# Patient Record
Sex: MALE | Race: WHITE | NOT HISPANIC OR LATINO | URBAN - METROPOLITAN AREA
[De-identification: names, ages, dates, MRNs, and addresses within clinical notes are randomized per-mention and may not be internally consistent; named-entity substitution may affect disease eponyms.]

---

## 2024-08-21 RX ORDER — FUROSEMIDE 20 MG/1
20 TABLET ORAL DAILY
COMMUNITY

## 2024-08-21 RX ORDER — ROSUVASTATIN CALCIUM 5 MG/1
5 TABLET, COATED ORAL NIGHTLY
COMMUNITY

## 2024-08-21 RX ORDER — METOPROLOL SUCCINATE 50 MG/1
1 TABLET, EXTENDED RELEASE ORAL DAILY
COMMUNITY

## 2024-08-21 RX ORDER — AMLODIPINE BESYLATE 2.5 MG/1
2.5 TABLET ORAL DAILY
COMMUNITY

## 2024-08-21 RX ORDER — ALFUZOSIN HYDROCHLORIDE 10 MG/1
10 TABLET, EXTENDED RELEASE ORAL DAILY
COMMUNITY

## 2024-08-21 ASSESSMENT — DUKE ACTIVITY SCORE INDEX (DASI)
TOTAL_SCORE: 25.95
CAN YOU WALK INDOORS, SUCH AS AROUND YOUR HOUSE: YES
CAN YOU CLIMB A FLIGHT OF STAIRS OR WALK UP A HILL: NO
CAN YOU TAKE CARE OF YOURSELF (EAT, DRESS, BATHE, OR USE TOILET): YES
CAN YOU DO HEAVY WORK AROUND THE HOUSE LIKE SCRUBBING FLOORS OR LIFTING AND MOVING HEAVY FURNITURE: YES
DASI METS SCORE: 5.9
CAN YOU DO LIGHT WORK AROUND THE HOUSE LIKE DUSTING OR WASHING DISHES: YES
CAN YOU DO MODERATE WORK AROUND THE HOUSE LIKE VACUUMING, SWEEPING FLOORS OR CARRYING GROCERIES: YES
CAN YOU WALK A BLOCK OR TWO ON LEVEL GROUND: YES
CAN YOU HAVE SEXUAL RELATIONS: NO
CAN YOU DO YARD WORK LIKE RAKING LEAVES, WEEDING OR PUSHING A MOWER: YES
CAN YOU PARTICIPATE IN STRENOUS SPORTS LIKE SWIMMING, SINGLES TENNIS, FOOTBALL, BASKETBALL, OR SKIING: NO
CAN YOU RUN A SHORT DISTANCE: NO
CAN YOU PARTICIPATE IN MODERATE RECREATIONAL ACTIVITIES LIKE GOLF, BOWLING, DANCING, DOUBLES TENNIS OR THROWING A BASEBALL OR FOOTBALL: NO

## 2024-08-21 ASSESSMENT — ACTIVITIES OF DAILY LIVING (ADL): ADL_SCORE: 0

## 2024-08-21 ASSESSMENT — LIFESTYLE VARIABLES: SMOKING_STATUS: NONSMOKER

## 2024-08-22 ENCOUNTER — PRE-ADMISSION TESTING (OUTPATIENT)
Dept: PREADMISSION TESTING | Facility: HOSPITAL | Age: 74
End: 2024-08-22
Payer: MEDICARE

## 2024-08-22 ENCOUNTER — LAB (OUTPATIENT)
Dept: LAB | Facility: LAB | Age: 74
End: 2024-08-22
Payer: MEDICARE

## 2024-08-22 VITALS
HEIGHT: 68 IN | RESPIRATION RATE: 17 BRPM | WEIGHT: 261.69 LBS | OXYGEN SATURATION: 98 % | BODY MASS INDEX: 39.66 KG/M2 | DIASTOLIC BLOOD PRESSURE: 98 MMHG | SYSTOLIC BLOOD PRESSURE: 170 MMHG | TEMPERATURE: 98.1 F | HEART RATE: 76 BPM

## 2024-08-22 DIAGNOSIS — Z01.818 PRE-OP TESTING: ICD-10-CM

## 2024-08-22 DIAGNOSIS — Z01.818 PRE-OP TESTING: Primary | ICD-10-CM

## 2024-08-22 LAB
ANION GAP SERPL CALC-SCNC: 12 MMOL/L (ref 10–20)
ATRIAL RATE: 66 BPM
BUN SERPL-MCNC: 24 MG/DL (ref 6–23)
CALCIUM SERPL-MCNC: 9.6 MG/DL (ref 8.6–10.3)
CHLORIDE SERPL-SCNC: 108 MMOL/L (ref 98–107)
CO2 SERPL-SCNC: 26 MMOL/L (ref 21–32)
CREAT SERPL-MCNC: 1.45 MG/DL (ref 0.5–1.3)
EGFRCR SERPLBLD CKD-EPI 2021: 51 ML/MIN/1.73M*2
GLUCOSE SERPL-MCNC: 121 MG/DL (ref 74–99)
P AXIS: 6 DEGREES
P OFFSET: 128 MS
P ONSET: 82 MS
POTASSIUM SERPL-SCNC: 4.1 MMOL/L (ref 3.5–5.3)
PR INTERVAL: 268 MS
Q ONSET: 216 MS
QRS COUNT: 10 BEATS
QRS DURATION: 90 MS
QT INTERVAL: 394 MS
QTC CALCULATION(BAZETT): 413 MS
QTC FREDERICIA: 407 MS
R AXIS: -18 DEGREES
SODIUM SERPL-SCNC: 142 MMOL/L (ref 136–145)
T AXIS: 68 DEGREES
T OFFSET: 413 MS
VENTRICULAR RATE: 66 BPM

## 2024-08-22 PROCEDURE — 99202 OFFICE O/P NEW SF 15 MIN: CPT | Performed by: NURSE PRACTITIONER

## 2024-08-22 PROCEDURE — 36415 COLL VENOUS BLD VENIPUNCTURE: CPT

## 2024-08-22 PROCEDURE — 80048 BASIC METABOLIC PNL TOTAL CA: CPT

## 2024-08-22 PROCEDURE — 93005 ELECTROCARDIOGRAM TRACING: CPT

## 2024-08-22 RX ORDER — HYDROCHLOROTHIAZIDE 12.5 MG/1
12.5 TABLET ORAL DAILY
COMMUNITY

## 2024-08-22 ASSESSMENT — ENCOUNTER SYMPTOMS
ENDOCRINE NEGATIVE: 1
NEUROLOGICAL NEGATIVE: 1
RESPIRATORY NEGATIVE: 1
CONSTITUTIONAL NEGATIVE: 1
EYES NEGATIVE: 1
NECK NEGATIVE: 1
GASTROINTESTINAL NEGATIVE: 1

## 2024-08-22 NOTE — PREPROCEDURE INSTRUCTIONS
Medication List            Accurate as of August 22, 2024 11:21 AM. Always use your most recent med list.                alfuzosin 10 mg 24 hr tablet  Commonly known as: Uroxatral  Medication Adjustments for Surgery: Take morning of surgery with sip of water, no other fluids     amLODIPine 2.5 mg tablet  Commonly known as: Norvasc  Medication Adjustments for Surgery: Take morning of surgery with sip of water, no other fluids     furosemide 20 mg tablet  Commonly known as: Lasix  Medication Adjustments for Surgery: Continue until night before surgery     hydroCHLOROthiazide 12.5 mg tablet  Commonly known as: Microzide  Medication Adjustments for Surgery: Take morning of surgery with sip of water, no other fluids     rosuvastatin 5 mg tablet  Commonly known as: Crestor  Medication Adjustments for Surgery: Take morning of surgery with sip of water, no other fluids     Toprol XL 50 mg 24 hr tablet  Generic drug: metoprolol succinate XL  Medication Adjustments for Surgery: Take morning of surgery with sip of water, no other fluids                                PRE-OPERATIVE INSTRUCTIONS    You will receive notification one business day prior to your procedure to confirm your arrival time. It is important that you answer your phone and/or check your messages during this time. If you do not hear from the surgery center by 5 pm. the day before your procedure, please call [phone].     Please enter the building through the Outpatient entrance and take the elevator off the lobby to the 2nd floor then check in at the Outpatient Surgery desk on the 2nd floor.    INSTRUCTIONS:  Talk to your surgeon for instructions if you should stop your aspirin, blood thinner, or diabetes medicines.  DO NOT take any multivitamins or over the counter supplements for 7-10 days before surgery.  If not being admitted, you must have an adult immediately available to drive you home after surgery. We also highly recommend you have someone  stay with you for the entire day and night of your surgery.  For children having surgery, a parent or legal guardian must accompany them to the surgery center. If this is not possible, please call [phone] to make additional arrangements.  For adults who are unable to consent or make medical decisions for themselves, a legal guardian or Power of  must accompany them to the surgery center. If this is not possible, please call [phone] to make additional arrangements.  Wear comfortable, loose fitting clothing.  All jewelry and piercings must be removed. If you are unable to remove an item or have a dermal piercing, please be sure to tell the nurse when you arrive for surgery.  Nail polish and make-up must be removed.  Avoid smoking or consuming alcohol for 24 hours before surgery.  To help prevent infection, please take a shower/bath and wash your hair the night before and/or morning of surgery (or follow other specific bathing instructions provided).    Preoperative Fasting Guidelines    Why must I stop eating and drinking near surgery time?  With sedation, food or liquid in your stomach can enter your lungs causing serious complications  Increases nausea and vomiting    When do I need to stop eating and drinking before my surgery?  Do not eat any solid food after midnight the night before your surgery/procedure unless otherwise instructed by your surgeon.   You may have up to 13.5 ounces of clear liquid until TWO hours before your instructed arrival time to the hospital.  This includes water, black tea/coffee, (no milk or cream) apple juice, and electrolyte drinks (Gatorade).   You may chew gum until TWO hours before your surgery/procedure      If applicable, notify your surgeons office immediately of any new skin changes that occur to the surgical limb.      If you have any questions or concerns, please call Pre-Admission Testing at [phone].                                     Home  Preoperative Antibacterial Shower with Chlorhexidine gluconate (CHG)     What is a home preoperative antibacterial shower?  This shower is a way of cleaning the skin with a germ killing solution before surgery. The solution contains chlorhexidine gluconate, commonly known as CHG. CHG is a skin cleanser with germ killing ability. Let your doctor know if you are allergic to chlorhexidine.    Why do I need to take a preoperative antibacterial shower?  Skin is not sterile. It is best to try to make your skin as free of germs as possible before surgery. Proper cleansing with a germ killing soap before surgery can lower the number of germs on your skin. This helps to reduce the risk of infection at the surgical site. Following the instructions listed below will help you prepare your skin for surgery.    How do I use the solution?  Begin using your CHG soap the night before and again the morning of your procedure.   Do not shave the day before or day of surgery.  Remove all jewelry until after surgery. Take off rings and take out all body-piercing jewelry.  Wash your face and hair with normal soap and shampoo before you use the CHG soap.  Apply the CHG solution to a clean wet washcloth. Move away from the water to avoid premature rinsing of the CHG soap as you are applying. Firmly lather your entire body from the neck down. Do not use CHG on your face, eyes, ears, or genitals.   Pay special attention to the area where your incisions will be located.  Do not scrub your skin too hard.  It is important to allow the CHG soap to sit on your skin for 3-5 minutes.  Rinse the solution off your body completely. Do not wash with your normal soap after the CHG soap solution.  Pat yourself dry with a clean, soft towel.  Do not apply powders, lotions or deodorants as these might block how the CHG soap works.   Dress in clean clothing.  Be sure to sleep with clean, freshly laundered sheets.  Be aware that CHG can cause stains on fabric.  Rinse your washcloth and other linens that have contact with CHG completely. Use only non-chlorine detergents to launder the items used.

## 2024-08-22 NOTE — CPM/PAT H&P
CPM/PAT Evaluation       Name: Brendon Vizcarra (Brendon Vizcarra)  /Age: 1950/74 y.o.     In-Person       Chief Complaint: pain at base of thumb    HPI  This is a very pleasant 75 yo with PmHx of hypertension, lipidemia, obesity, and left thumb pain.  Plan is for the soft tissue left thumb.  Patient reports he has pain at the base of his thumb and wrist with any kind of thumb movement.  He denies recent fever or chills.    Past Medical History:   Diagnosis Date    History of chronic kidney disease     Hyperlipidemia     Hypertension     Obesity     Pain of left thumb     base of wrist /thumb    Renal cancer, left (Multi)        Past Surgical History:   Procedure Laterality Date    ANUS SURGERY      CARPAL TUNNEL RELEASE      NEPHRECTOMY Left        Patient  has no history on file for sexual activity.    Family History   Problem Relation Name Age of Onset    Hypertension Mother      Brain Aneurysm Father      Heart attack Father         Allergies   Allergen Reactions    Penicillin Rash       Prior to Admission medications    Medication Sig Start Date End Date Taking? Authorizing Provider   alfuzosin (Uroxatral) 10 mg 24 hr tablet Take 1 tablet (10 mg) by mouth once daily. Do not crush, chew, or split.    Historical Provider, MD   amLODIPine (Norvasc) 2.5 mg tablet Take 1 tablet (2.5 mg) by mouth once daily.    Historical Provider, MD   furosemide (Lasix) 20 mg tablet Take 1 tablet (20 mg) by mouth once daily.    Historical Provider, MD   hydroCHLOROthiazide (Microzide) 12.5 mg tablet Take 1 tablet (12.5 mg) by mouth once daily. Pt has not taken for 2 weeks, has not received this med from his mail order pharmacy as of today.    Historical Provider, MD   metoprolol succinate XL (Toprol XL) 50 mg 24 hr tablet Take 1 tablet (50 mg) by mouth once daily.    Historical Provider, MD   rosuvastatin (Crestor) 5 mg tablet Take 1 tablet (5 mg) by mouth once daily at bedtime.    Historical Provider, MD RODRIGUEZ ROS:    Constitutional:   neg    Neuro/Psych:   neg    Eyes:   neg     use of corrective lenses  Ears:   neg    Nose:   neg    Mouth:   neg    Throat:   neg    Neck:    Carotid bruits appreciated  neg    Cardio:    Pt with Hx of HTN   Had recent Holter monitor, nuclear stress test, and ECG done at [facility]  Respiratory:   neg    Endocrine:   neg    GI:   neg    :    Pt reports overactive bladder- denies dysuria\    Pt reports a hx of nephrectomy of left kidney due to abnormality at top of kidney per pt  Musculoskeletal:    See HPI  Pt has had previous carpal tunnel on right and soft release right thumb due to thumb pain  Hematologic:   neg    Skin:  neg        Physical Exam  Constitutional:       Appearance: Normal appearance.   HENT:      Head: Normocephalic and atraumatic.      Nose: Nose normal.      Mouth/Throat:      Mouth: Mucous membranes are moist.   Eyes:      Pupils: Pupils are equal, round, and reactive to light.   Neck:      Comments: Carotid bruits appreciated  Cardiovascular:      Rate and Rhythm: Normal rate and regular rhythm.   Pulmonary:      Effort: Pulmonary effort is normal.      Breath sounds: Normal breath sounds.   Abdominal:      General: Bowel sounds are normal.      Palpations: Abdomen is soft.      Comments: Obese   Musculoskeletal:      Comments: Trace ankle edema   Skin:     General: Skin is warm and dry.   Neurological:      General: No focal deficit present.      Mental Status: He is alert and oriented to person, place, and time.   Psychiatric:         Mood and Affect: Mood normal.         Behavior: Behavior normal.        Airway limited   Anesthesia:  Patient denies any anesthesia complications.   ECG: SR 1st degree AV block criteria for old anteroseptal infarct no acute changes form ECG of 3/2023    Holter results from 5/20/2024- predominant rhythm NSR with first degree AVB, average HR 81 occasional PAC's, and runs of PAC's    Cardiac nuclear stress test done 3/2023- normal myocardial  perfusion scan without any evidence of ischemia or infarction    Lab Results   Component Value Date    GLUCOSE 121 (H) 08/22/2024    CALCIUM 9.6 08/22/2024     08/22/2024    K 4.1 08/22/2024    CO2 26 08/22/2024     (H) 08/22/2024    BUN 24 (H) 08/22/2024    CREATININE 1.45 (H) 08/22/2024         Visit Vitals  BP (!) 170/98 Comment: Pt has not taken his HCTZ for 2 weeks because meds have not come from his mail order pharmacy as of today.   Pulse 76   Temp 36.7 °C (98.1 °F) (Temporal)   Resp 17       DASI Risk Score      Flowsheet Row Most Recent Value   DASI SCORE 25.95   METS Score (Will be calculated only when all the questions are answered) 5.9          Caprini DVT Assessment      Flowsheet Row Most Recent Value   DVT Score 7   Current Status Major surgery planned, including arthroscopic and laproscopic (1-2 hours)   History Prior major surgery, Previous malignancy   Age 60-75 years          Modified Frailty Index      Flowsheet Row Most Recent Value   Modified Frailty Index Calculator .0909          CHADS2 Stroke Risk  Current as of a minute ago        N/A 3 to 100%: High Risk   2 to < 3%: Medium Risk   0 to < 2%: Low Risk     Last Change: N/A          This score determines the patient's risk of having a stroke if the patient has atrial fibrillation.        This score is not applicable to this patient. Components are not calculated.          Revised Cardiac Risk Index      Flowsheet Row Most Recent Value   Revised Cardiac Risk Calculator 0          Apfel Simplified Score      Flowsheet Row Most Recent Value   Apfel Simplified Score Calculator 1          Risk Analysis Index Results This Encounter         8/21/2024  1339             BADILLO Cancer History: Patient does not indicate history of cancer    Total Risk Analysis Index Score Without Cancer: 33    Total Risk Analysis Index Score: 33          Stop Bang Score      Flowsheet Row Most Recent Value   Do you snore loudly? 0   Do you often feel tired or  fatigued after your sleep? 0   Has anyone ever observed you stop breathing in your sleep? 0   Do you have or are you being treated for high blood pressure? 1   Recent BMI (Calculated) 0   Age older than 50 years old? 1=Yes   Is your neck circumference greater than 17 inches (Male) or 16 inches (Female)? 0   Gender - Male 1=Yes            Assessment and Plan:     Plan for OR on 8/27 for   RELEASE,SOFT TISSUE,UPPER EXTREMITY [39024 (CPT®)] Left   Obtain previous ECG from [facility] and recent stress test

## 2024-08-27 ENCOUNTER — HOSPITAL ENCOUNTER (OUTPATIENT)
Facility: HOSPITAL | Age: 74
Setting detail: OUTPATIENT SURGERY
Discharge: HOME | End: 2024-08-27
Attending: ORTHOPAEDIC SURGERY | Admitting: ORTHOPAEDIC SURGERY
Payer: MEDICARE

## 2024-08-27 ENCOUNTER — ANESTHESIA EVENT (OUTPATIENT)
Dept: OPERATING ROOM | Facility: HOSPITAL | Age: 74
End: 2024-08-27
Payer: MEDICARE

## 2024-08-27 ENCOUNTER — ANESTHESIA (OUTPATIENT)
Dept: OPERATING ROOM | Facility: HOSPITAL | Age: 74
End: 2024-08-27
Payer: MEDICARE

## 2024-08-27 VITALS
TEMPERATURE: 97.9 F | OXYGEN SATURATION: 96 % | SYSTOLIC BLOOD PRESSURE: 133 MMHG | HEART RATE: 66 BPM | RESPIRATION RATE: 18 BRPM | HEIGHT: 68 IN | DIASTOLIC BLOOD PRESSURE: 69 MMHG | BODY MASS INDEX: 39.4 KG/M2 | WEIGHT: 260 LBS

## 2024-08-27 PROCEDURE — 7100000009 HC PHASE TWO TIME - INITIAL BASE CHARGE: Performed by: ORTHOPAEDIC SURGERY

## 2024-08-27 PROCEDURE — A25116 PR RAD EXCIS WRIST SYNOV/TENDON,EXTEN: Performed by: ANESTHESIOLOGY

## 2024-08-27 PROCEDURE — 3600000003 HC OR TIME - INITIAL BASE CHARGE - PROCEDURE LEVEL THREE: Performed by: ORTHOPAEDIC SURGERY

## 2024-08-27 PROCEDURE — 99100 ANES PT EXTEME AGE<1 YR&>70: CPT | Performed by: ANESTHESIOLOGY

## 2024-08-27 PROCEDURE — 3700000002 HC GENERAL ANESTHESIA TIME - EACH INCREMENTAL 1 MINUTE: Performed by: ORTHOPAEDIC SURGERY

## 2024-08-27 PROCEDURE — A25116 PR RAD EXCIS WRIST SYNOV/TENDON,EXTEN: Performed by: NURSE ANESTHETIST, CERTIFIED REGISTERED

## 2024-08-27 PROCEDURE — 7100000010 HC PHASE TWO TIME - EACH INCREMENTAL 1 MINUTE: Performed by: ORTHOPAEDIC SURGERY

## 2024-08-27 PROCEDURE — 2500000004 HC RX 250 GENERAL PHARMACY W/ HCPCS (ALT 636 FOR OP/ED): Performed by: NURSE ANESTHETIST, CERTIFIED REGISTERED

## 2024-08-27 PROCEDURE — 2720000007 HC OR 272 NO HCPCS: Performed by: ORTHOPAEDIC SURGERY

## 2024-08-27 PROCEDURE — 3600000008 HC OR TIME - EACH INCREMENTAL 1 MINUTE - PROCEDURE LEVEL THREE: Performed by: ORTHOPAEDIC SURGERY

## 2024-08-27 PROCEDURE — 3700000001 HC GENERAL ANESTHESIA TIME - INITIAL BASE CHARGE: Performed by: ORTHOPAEDIC SURGERY

## 2024-08-27 PROCEDURE — 2500000004 HC RX 250 GENERAL PHARMACY W/ HCPCS (ALT 636 FOR OP/ED): Performed by: ORTHOPAEDIC SURGERY

## 2024-08-27 RX ORDER — FENTANYL CITRATE 50 UG/ML
INJECTION, SOLUTION INTRAMUSCULAR; INTRAVENOUS AS NEEDED
Status: DISCONTINUED | OUTPATIENT
Start: 2024-08-27 | End: 2024-08-27

## 2024-08-27 RX ORDER — PROPOFOL 10 MG/ML
INJECTION, EMULSION INTRAVENOUS AS NEEDED
Status: DISCONTINUED | OUTPATIENT
Start: 2024-08-27 | End: 2024-08-27

## 2024-08-27 RX ORDER — CEFAZOLIN SODIUM 2 G/100ML
2 INJECTION, SOLUTION INTRAVENOUS ONCE
Status: CANCELLED | OUTPATIENT
Start: 2024-08-27 | End: 2024-08-27

## 2024-08-27 RX ORDER — LIDOCAINE HYDROCHLORIDE 5 MG/ML
INJECTION, SOLUTION INFILTRATION; INTRAVENOUS AS NEEDED
Status: DISCONTINUED | OUTPATIENT
Start: 2024-08-27 | End: 2024-08-27

## 2024-08-27 RX ORDER — BUPIVACAINE HYDROCHLORIDE 5 MG/ML
INJECTION, SOLUTION EPIDURAL; INTRACAUDAL AS NEEDED
Status: DISCONTINUED | OUTPATIENT
Start: 2024-08-27 | End: 2024-08-27 | Stop reason: HOSPADM

## 2024-08-27 RX ORDER — CEFAZOLIN SODIUM 2 G/100ML
INJECTION, SOLUTION INTRAVENOUS AS NEEDED
Status: DISCONTINUED | OUTPATIENT
Start: 2024-08-27 | End: 2024-08-27

## 2024-08-27 RX ORDER — SODIUM CHLORIDE, SODIUM LACTATE, POTASSIUM CHLORIDE, CALCIUM CHLORIDE 600; 310; 30; 20 MG/100ML; MG/100ML; MG/100ML; MG/100ML
20 INJECTION, SOLUTION INTRAVENOUS CONTINUOUS
Status: CANCELLED | OUTPATIENT
Start: 2024-08-27

## 2024-08-27 SDOH — HEALTH STABILITY: MENTAL HEALTH: CURRENT SMOKER: 0

## 2024-08-27 ASSESSMENT — PAIN SCALES - GENERAL
PAINLEVEL_OUTOF10: 0 - NO PAIN

## 2024-08-27 ASSESSMENT — PAIN - FUNCTIONAL ASSESSMENT: PAIN_FUNCTIONAL_ASSESSMENT: 0-10

## 2024-08-27 ASSESSMENT — COLUMBIA-SUICIDE SEVERITY RATING SCALE - C-SSRS
6. HAVE YOU EVER DONE ANYTHING, STARTED TO DO ANYTHING, OR PREPARED TO DO ANYTHING TO END YOUR LIFE?: NO
2. HAVE YOU ACTUALLY HAD ANY THOUGHTS OF KILLING YOURSELF?: NO
1. IN THE PAST MONTH, HAVE YOU WISHED YOU WERE DEAD OR WISHED YOU COULD GO TO SLEEP AND NOT WAKE UP?: NO

## 2024-08-27 NOTE — DISCHARGE INSTRUCTIONS
Please keep the dressing on and dry.  Elevate.  Cover for showering purposes.  May ice as needed.  Call office at [phone] for follow-up appointment in 7 to 10 days.  Prescription sent to his pharmacy via my office yesterday

## 2024-08-27 NOTE — ANESTHESIA POSTPROCEDURE EVALUATION
Patient: Brendon Vizcarra    Procedure Summary       Date: 08/27/24 Room / Location: [facility] OR 02 / Virtual [facility] OR    Anesthesia Start: 1038 Anesthesia Stop:     Procedure: RELEASE,SOFT TISSUE,UPPER EXTREMITY (Left: Wrist) Diagnosis: (M65.4 - Radial styloid tenosynovitis [de Quervain])    Surgeons: Melita Romero MD Responsible Provider: Katie Piper MD    Anesthesia Type: general ASA Status: 3            Anesthesia Type: general    Vitals Value Taken Time   /78 08/27/24 1119   Temp 36 08/27/24 1119   Pulse 71 08/27/24 1119   Resp 169 08/27/24 1119   SpO2 98 08/27/24 1119       Anesthesia Post Evaluation    Patient location during evaluation: PACU  Patient participation: complete - patient participated  Level of consciousness: awake and alert  Pain management: adequate  Airway patency: patent  Cardiovascular status: acceptable  Respiratory status: acceptable  Hydration status: acceptable  Postoperative Nausea and Vomiting: none        There were no known notable events for this encounter.

## 2024-08-27 NOTE — ANESTHESIA PREPROCEDURE EVALUATION
Patient: Brendon Vizcarra    Procedure Information       Anesthesia Start Date/Time: 08/27/24 1038    Procedure: RELEASE,SOFT TISSUE,UPPER EXTREMITY (Left: Wrist)    Location: [facility] OR 02 / [facility] OR    Surgeons: Melita Romero MD            Relevant Problems   No relevant active problems       Clinical information reviewed:   Tobacco  Allergies  Meds  Problems  Med Hx  Surg Hx   Fam Hx  Soc   Hx        NPO Detail:  NPO/Void Status  Date of Last Liquid: 08/26/24  Time of Last Liquid: 1930  Date of Last Solid: 08/26/24  Time of Last Solid: 1600         Physical Exam    Airway  Mallampati: II  TM distance: >3 FB  Neck ROM: full     Cardiovascular - normal exam  Rhythm: regular  Rate: normal     Dental   (+) upper dentures     Pulmonary   Breath sounds clear to auscultation  (+) decreased breath sounds     Abdominal   (+) obese  Abdomen: soft  Bowel sounds: normal           Anesthesia Plan    History of general anesthesia?: yes  History of complications of general anesthesia?: no    ASA 3     general     The patient is not a current smoker.  Patient was previously instructed to abstain from smoking on day of procedure.  Patient did not smoke on day of procedure.  Education provided regarding risk of obstructive sleep apnea.  intravenous induction   Anesthetic plan and risks discussed with patient.  Use of blood products discussed with patient who consented to blood products.    Plan discussed with CAA.       Spoke with patient to confirm nurse visit at 2:00pm today.

## 2024-08-27 NOTE — ANESTHESIA PROCEDURE NOTES
Peripheral Block    Patient location during procedure: OR  Start time: 8/27/2024 10:43 AM  End time: 8/27/2024 10:43 AM  Reason for block: procedure for pain and at surgeon's request  Staffing  Performed: CRNA   Authorized by: Katie Piper MD    Performed by: JUSTINA Arellano  Preanesthetic Checklist  Completed: patient identified, IV checked, site marked, risks and benefits discussed, surgical consent, monitors and equipment checked, pre-op evaluation and timeout performed   Timeout performed at: 8/27/2024 10:43 AM  Peripheral Block  Patient position: laying flat  Prep: ChloraPrep  Patient monitoring: heart rate, cardiac monitor and continuous pulse ox  Block type: Mayur block  Injection technique: single-shot

## 2024-08-27 NOTE — OP NOTE
RELEASE,SOFT TISSUE,UPPER EXTREMITY (L) Operative Note     Date: 2024  OR Location: [facility] OR    Name: Brendon Vizcarra, : 1950, Age: 74 y.o., MRN: [id number], Sex: male    Preoperative diagnosis: Left de Quervain's tenosynovitis  Operative diagnosis: Same with extensive tenosynovitis abductor pollicis longus and extensor pollicis brevis tendons wrist    Procedures  Left radical tenosynovectomy abductor pollicis longus tendon wrist  Left radical tenosynovectomy extensor pollicis brevis tendon wrist  Left de Quervain's release    Surgeons      * Melita Romero - Primary    Resident/Fellow/Other Assistant:  Surgeons and Role:  * No surgeons found with a matching role *    Procedure Summary  Anesthesia: General  ASA: III  Anesthesia Staff: Anesthesiologist: Katie Piper MD  CRNA: PRASHANT Arellano-CRNA  Estimated Blood Loss: 0 mL  Intra-op Medications:   Administrations occurring from 1045 to 1115 on 24:   Medication Name Total Dose   bupivacaine PF (Marcaine) 0.5 % (5 mg/mL) injection 2 mL              Anesthesia Record               Intraprocedure I/O Totals       None           Specimen: No specimens collected     Staff:   Circulator: Mariaa Macias Person: Yas  Circulator: Isabel     Drains and/or Catheters: * None in log *    Tourniquet Times:     Total Tourniquet Time Documented:  area (laterality) - 24 minutes  Total: area (laterality) - 24 minutes      Findings: Extensive tenosynovitis APL and EPB tendons  Complications:  None; patient tolerated the procedure well.    Disposition: PACU - hemodynamically stable.  Condition: stable     Brief clinical note:    Patient presents for left de Quervain's tenosynovitis.  The patient failed conservative measures.  Patient wished to proceed for surgical release.  Patient understands Intra-Op residual made.  She understands risks inherent to the procedure which include but not limited to injury soft tissue nerves and vessels, medical and anesthetic  risks, numbness in around the incision and distal to the incision, infection, wound dehiscence, and residual pain or discomfort.  The patient is consented and marked.    Operative note:    Patient presents the operating room.  The patient's left upper extremity is prepped and draped in usual manner.  A final timeout is done with the operative team.  Incision is carried out longitudinal in line with the extensor kelley.  Longitudinal dissection was then carried out with a tenotomy.  A sensory nerve is identified and protected throughout the rest of the case.  The incision is then made in the extensor kelley from proximal distal.  This follows it all the way distal to the radial styloid.  Unfortunately the extensor pollicis brevis was includes 2 slips as well as the abductor pollicis longus are bundled in tenosynovium.  Some evidence inflammatory some of it appears chronic.  Therefore a thorough tenosynovectomy is required.  The first thing that is done is the EPL tendon from proximal distal undergoes a thorough tenosynovectomy.  This includes multiple slips.  Those areas are excised proximally and distally.  The tendon is protected at all times.  Once that is performed that tendon is retracted and attention is turned to the abductor pollicis longus tendon.  From the musculotendinous junction and distal the abductor pollicis longus likewise undergoes a thorough tenosynovectomy.  This is done from proximal distal.  He removes it from the adjacent tissues as well as the adjacent tendon.  Once that is clearly identified and it does not have any intervening septa falls back into the tunnel with the extensor pollicis brevis.  Both of those tendons now following the first compartment and freely flowing without any evidence of subluxation or catching decompression and tenosynovectomy is complete.   Closure is done with several subcuticular sutures made of Monocryl followed by Steri-Strips.  Marcaine without epinephrine is  infiltrated the incision for postoperative pain relief.  The patient is then placed in a thumb spica dressing.  The patient went to the recovery room in stable condition.  The patient had no intraoperative complications.    Melita Romero  Phone Number: [phone]

## 2024-08-29 LAB
ATRIAL RATE: 66 BPM
P AXIS: 6 DEGREES
P OFFSET: 128 MS
P ONSET: 82 MS
PR INTERVAL: 268 MS
Q ONSET: 216 MS
QRS COUNT: 10 BEATS
QRS DURATION: 90 MS
QT INTERVAL: 394 MS
QTC CALCULATION(BAZETT): 413 MS
QTC FREDERICIA: 407 MS
R AXIS: -18 DEGREES
T AXIS: 68 DEGREES
T OFFSET: 413 MS
VENTRICULAR RATE: 66 BPM

## 2024-09-29 LAB
ATRIAL RATE: 66 BPM
P AXIS: 6 DEGREES
P OFFSET: 128 MS
P ONSET: 82 MS
PR INTERVAL: 268 MS
Q ONSET: 216 MS
QRS COUNT: 10 BEATS
QRS DURATION: 90 MS
QT INTERVAL: 394 MS
QTC CALCULATION(BAZETT): 413 MS
QTC FREDERICIA: 407 MS
R AXIS: -18 DEGREES
T AXIS: 68 DEGREES
T OFFSET: 413 MS
VENTRICULAR RATE: 66 BPM

## (undated) DEVICE — SPLINT, FAST SETTING, 3 X 15 IN, PLASTER, BLUE

## (undated) DEVICE — PADDING, WEBRIL, UNDERCAST, STERILE, 2IN

## (undated) DEVICE — DRESSING, GAUZE, SUPER KERLIX, 6X6

## (undated) DEVICE — TOWEL PACK, STERILE, 4/PACK, BLUE

## (undated) DEVICE — Device

## (undated) DEVICE — DRESSING, GAUZE, PETROLATUM, PATCH, XEROFORM, 1 X 8 IN, STERILE

## (undated) DEVICE — BANDAGE, ELASTIC, PREMIUM, SELF-CLOSE, 2 IN X 5 YD, STERILE

## (undated) DEVICE — SUTURE, MONOCRYL, 4-0, 27 IN, PS-2, UNDYED

## (undated) DEVICE — SOLUTION, IRRIGATION, STERILE WATER, 1000 ML, POUR BOTTLE

## (undated) DEVICE — GLOVE, SURGICAL, PROTEXIS PI MICRO, 8.0, PF, LF

## (undated) DEVICE — SUTURE, ETHILON, 5-0, 18 IN, PS2, BLK

## (undated) DEVICE — APPLICATOR, CHLORAPREP, W/ORANGE TINT, 26ML

## (undated) DEVICE — SOLUTION, IRRIGATION, SODIUM CHLORIDE 0.9%, 1000 ML, POUR BOTTLE

## (undated) DEVICE — SPLINT, FAST SETTING, 5 X 30 IN, PLASTER

## (undated) DEVICE — CORD, CAUTERY, BIOPOLAR FORCEP, 12FT